# Patient Record
Sex: MALE
[De-identification: names, ages, dates, MRNs, and addresses within clinical notes are randomized per-mention and may not be internally consistent; named-entity substitution may affect disease eponyms.]

---

## 2021-10-13 PROBLEM — Z00.00 ENCOUNTER FOR PREVENTIVE HEALTH EXAMINATION: Status: ACTIVE | Noted: 2021-10-13

## 2021-10-19 ENCOUNTER — APPOINTMENT (OUTPATIENT)
Dept: SURGERY | Facility: CLINIC | Age: 66
End: 2021-10-19
Payer: COMMERCIAL

## 2021-10-19 VITALS
HEART RATE: 63 BPM | BODY MASS INDEX: 32.42 KG/M2 | TEMPERATURE: 97.2 F | SYSTOLIC BLOOD PRESSURE: 149 MMHG | WEIGHT: 242 LBS | DIASTOLIC BLOOD PRESSURE: 84 MMHG | OXYGEN SATURATION: 97 % | HEIGHT: 72.5 IN

## 2021-10-19 DIAGNOSIS — Z78.9 OTHER SPECIFIED HEALTH STATUS: ICD-10-CM

## 2021-10-19 DIAGNOSIS — Z87.891 PERSONAL HISTORY OF NICOTINE DEPENDENCE: ICD-10-CM

## 2021-10-19 DIAGNOSIS — Z82.49 FAMILY HISTORY OF ISCHEMIC HEART DISEASE AND OTHER DISEASES OF THE CIRCULATORY SYSTEM: ICD-10-CM

## 2021-10-19 DIAGNOSIS — K40.90 UNILATERAL INGUINAL HERNIA, W/OUT OBSTRUCTION OR GANGRENE, NOT SPECIFIED AS RECURRENT: ICD-10-CM

## 2021-10-19 DIAGNOSIS — K42.9 UMBILICAL HERNIA W/OUT OBSTRUCTION OR GANGRENE: ICD-10-CM

## 2021-10-19 PROCEDURE — 99204 OFFICE O/P NEW MOD 45 MIN: CPT

## 2021-10-19 RX ORDER — OLMESARTAN MEDOXOMIL 40 MG/1
TABLET, FILM COATED ORAL
Refills: 0 | Status: ACTIVE | COMMUNITY

## 2021-10-19 RX ORDER — ASPIRIN 81 MG/1
81 TABLET, CHEWABLE ORAL
Refills: 0 | Status: ACTIVE | COMMUNITY

## 2021-10-19 RX ORDER — FENOFIBRATE 145 MG/1
145 TABLET, COATED ORAL
Refills: 0 | Status: ACTIVE | COMMUNITY

## 2021-10-19 NOTE — ASSESSMENT
[FreeTextEntry1] : Mr. Griffiths is a 66-year-old man with a right inguinal hernia, an umbilical hernia, and a possible left inguinal hernia.  We will plan for a robotic-assisted right, possible bilateral, inguinal hernia repair with mesh and umbilical hernia repair with possible mesh on November 17, 2021.

## 2021-10-19 NOTE — CONSULT LETTER
[FreeTextEntry1] : 2021\par \par \par \par Buster Minor M.D.\par [address], [address]\[place] [address]\par Telephone #: [phone]\par \par \par Re: Tj Griffiths\par : 1955\par \par \par Dear Dr. Minor:\par \par I had the opportunity to see Mr. Griffiths today for evaluation and management of a right inguinal hernia.  He stated the hernia has been present for an unclear amount of time.  He first noticed the hernia as a bulge in the right groin.  He denied significant pain of the hernia.  He stated the hernia is enlarging.\par \par On physical examination, his height is 6 feet 0.5 inches, his weight is 242 pounds, and BMI is 32.37.  His temperature is 97.2 °F, blood pressure is 149/84, heart rate 63, and O2 saturation 97% on room air.  In general, he is a well-dressed, well-nourished man who appears his stated age and is in no acute distress.  He is calm, alert and oriented x 3.  HEENT exam demonstrates no scleral icterus and a normocephalic atraumatic appearance.  His abdomen has audible bowel sounds, is soft, non-tender, and non-distended.  There is no hepatosplenomegaly.  There is a reducible, non-tender umbilical hernia.  His extremities are warm and dry without evidence of clubbing, cyanosis, or edema.  Bilateral groin examination demonstrated a reducible right inguinal hernia and a possible left inguinal hernia.  \par \par In summary, Mr. Griffiths is a 66-year-old man with a right inguinal hernia, an umbilical hernia, and a possible left inguinal hernia.  We will plan for a robotic-assisted right, possible bilateral, inguinal hernia repair with mesh and umbilical hernia repair with possible mesh on 2021.\par \par Thank you for the opportunity to care for this patient. Please do not hesitate to contact me in the event that you have any questions or concerns about the care of this patient.\par \par Sincerely,\par \par \par \par Brandi Pool M.D.\par \par CC:\par Sheldon Kanchan, M.D.\par [address]\[place] [address]\par Telephone #: [phone]
Diagnosis/Treatment Options/Palliative Care Referral/Prognosis

## 2021-10-19 NOTE — PHYSICAL EXAM
[Calm] : calm [de-identified] : NAD, comfortable [de-identified] : NCAT, no scleral icterus [de-identified] : +BS soft NT ND.  No hepatosplenomegaly.  Small umbilical hernia, reducible and non-tender. [de-identified] : Reducible right inguinal hernia.  Possible left inguinal hernia. [de-identified] : No clubbing, cyanosis, or edema. [de-identified] : Warm, dry. [de-identified] : A&Ox3

## 2021-10-19 NOTE — HISTORY OF PRESENT ILLNESS
[de-identified] : Mr. Griffiths presented today for evaluation and management of a right inguinal hernia.  He stated the hernia has been present for an unclear amount of time.  He first noticed the hernia as a bulge in the right groin.  He denied significant pain of the hernia.  He stated the hernia is enlarging.

## 2024-01-12 ENCOUNTER — APPOINTMENT (OUTPATIENT)
Dept: ENDOCRINOLOGY | Facility: CLINIC | Age: 69
End: 2024-01-12
Payer: COMMERCIAL

## 2024-01-12 VITALS
SYSTOLIC BLOOD PRESSURE: 145 MMHG | WEIGHT: 240 LBS | DIASTOLIC BLOOD PRESSURE: 79 MMHG | HEART RATE: 67 BPM | BODY MASS INDEX: 32.51 KG/M2 | HEIGHT: 72 IN

## 2024-01-12 DIAGNOSIS — Z86.39 PERSONAL HISTORY OF OTHER ENDOCRINE, NUTRITIONAL AND METABOLIC DISEASE: ICD-10-CM

## 2024-01-12 DIAGNOSIS — Z83.3 FAMILY HISTORY OF DIABETES MELLITUS: ICD-10-CM

## 2024-01-12 DIAGNOSIS — Z86.79 PERSONAL HISTORY OF OTHER DISEASES OF THE CIRCULATORY SYSTEM: ICD-10-CM

## 2024-01-12 DIAGNOSIS — E78.5 HYPERLIPIDEMIA, UNSPECIFIED: ICD-10-CM

## 2024-01-12 LAB
GLUCOSE BLDC GLUCOMTR-MCNC: 224
HBA1C MFR BLD HPLC: 8.7

## 2024-01-12 PROCEDURE — 82962 GLUCOSE BLOOD TEST: CPT

## 2024-01-12 PROCEDURE — 83036 HEMOGLOBIN GLYCOSYLATED A1C: CPT | Mod: QW

## 2024-01-12 PROCEDURE — 99205 OFFICE O/P NEW HI 60 MIN: CPT | Mod: GC,25

## 2024-01-12 PROCEDURE — 36415 COLL VENOUS BLD VENIPUNCTURE: CPT

## 2024-01-12 RX ORDER — PIOGLITAZONE HYDROCHLORIDE 30 MG/1
30 TABLET ORAL
Refills: 0 | Status: DISCONTINUED | COMMUNITY
End: 2024-01-12

## 2024-01-12 RX ORDER — SEMAGLUTIDE 1.34 MG/ML
2 INJECTION, SOLUTION SUBCUTANEOUS
Refills: 0 | Status: DISCONTINUED | COMMUNITY
End: 2024-01-12

## 2024-01-12 NOTE — END OF VISIT
[] : Fellow [FreeTextEntry3] : Agree with Dr. Franco assessment and plan above. The patient has uncontrolled type 2 diabetes and requires improved diabetes management. He admits that he likes eating and declines nutrition visit today. On food recall, diet is high in starches/carbohydrates and portion size.  The patient is aware that diet requires improvement but admits changes are hard.  A1c goal for this patient is 7%.  Pioglitazone is weight unfavorable and we can discontinue that.  While ozempic is a good medication, he requiers better glycemic and weight control. Change to tirzepatide today. Patient will get back to us on Xigduo doses-if not on maximum dose we have room to increase there as well. Educated that he's already on 3 oral diabetes medicines, next step would be basal insulin and/or repaglinide with meals.  He was informed that insulin is weight unfavorable [Time Spent: ___ minutes] : I have spent [unfilled] minutes of time on the encounter. [>50% of the face to face encounter time was spent on counseling and/or coordination of care for ___] : Greater than 50% of the face to face encounter time was spent on counseling and/or coordination of care for [unfilled]

## 2024-01-12 NOTE — PHYSICAL EXAM
[Alert] : alert [Well Nourished] : well nourished [No Acute Distress] : no acute distress [Well Developed] : well developed [Normal Sclera/Conjunctiva] : normal sclera/conjunctiva [EOMI] : extra ocular movement intact [No Proptosis] : no proptosis [Normal Oropharynx] : the oropharynx was normal [Thyroid Not Enlarged] : the thyroid was not enlarged [No Thyroid Nodules] : no palpable thyroid nodules [No Respiratory Distress] : no respiratory distress [No Accessory Muscle Use] : no accessory muscle use [Clear to Auscultation] : lungs were clear to auscultation bilaterally [Normal S1, S2] : normal S1 and S2 [Normal Rate] : heart rate was normal [Regular Rhythm] : with a regular rhythm [No Edema] : no peripheral edema [Pedal Pulses Normal] : the pedal pulses are present [Normal Bowel Sounds] : normal bowel sounds [Not Tender] : non-tender [Not Distended] : not distended [Soft] : abdomen soft [Normal Anterior Cervical Nodes] : no anterior cervical lymphadenopathy [Normal Posterior Cervical Nodes] : no posterior cervical lymphadenopathy [No Stigmata of Cushings Syndrome] : no stigmata of Cushings Syndrome [Normal Gait] : normal gait [Normal Strength/Tone] : muscle strength and tone were normal [Normal Reflexes] : deep tendon reflexes were 2+ and symmetric [No Tremors] : no tremors [Normal Sensation on Monofilament Testing] : normal sensation on monofilament testing of lower extremities [Oriented x3] : oriented to person, place, and time [Acanthosis Nigricans] : no acanthosis nigricans [Foot Ulcers] : no foot ulcers [Right foot was examined, including] : right foot ~C was examined, including visual inspection with sensory and pulse exams [Left foot was examined, including] : left foot ~C was examined, including visual inspection with sensory and pulse exams [Diminished Throughout Both Feet] : normal tactile sensation with monofilament testing throughout both feet [#1 Diminished] : number 1 was normal [#2 Diminished] : number 2 was normal [#3 Diminished] : number 3 was normal [#4 Diminished] : number 4 was normal [#5 Diminished] : number 5 was normal [#6 Diminished] : number 6 was normal [#7 Diminished] : number 7 was normal [#8 Diminished] : number 8 was normal [#9 Diminished] : number 9 was normal [#10 Diminished] : number 10 was normal [de-identified] : No foot ulcers or lesions [de-identified] : Normal sensation on monofilament testing

## 2024-01-12 NOTE — ASSESSMENT
[Diabetes Foot Care] : diabetes foot care [Long Term Vascular Complications] : long term vascular complications of diabetes [Carbohydrate Consistent Diet] : carbohydrate consistent diet [Importance of Diet and Exercise] : importance of diet and exercise to improve glycemic control, achieve weight loss and improve cardiovascular health [Self Monitoring of Blood Glucose] : self monitoring of blood glucose [FreeTextEntry1] : # Type 2 Diabetes Mellitus # BMI 32.5 - current poc a1c 8.7 (it was endorsed there was some calibration issue), therefore will send in the formal a1c testing - pt currently taking Ozempic 0.5 weekly, pioglitazone 30 daily, Xigduo (Dapagliflozin and metformin) - the diet appears to consist of heavy amount of carbohydrates with fruits - CGM indicates higher readings >200 during lunch time - overall DM is not well controlled - counselled pt to work on reducing carbohydrate intake from big plate to 1/2 cup of rice.  And to observe which fruits that he is taking is spiking the glucose levels.  told him the berries have the lower glycemic index.   - counselled pt to increase moderate intensity exercise to at least 150mins/week - pt will call back the office to verify the dose of Xigduo - the dose will have to be maximized - due to resistance in weight loss despite being on Ozempic for one year, Pioglitazone may be contributing to the counter weight gain.  Since benefits do not appear to outweigh risk in this patient, advised pt to discontinue the drug - warned pt that he may notice worsening hyperglycemia upon discontinuing pioglitazone, and to call the patient if he's experiencing that.  The option may be to add Repaglinide.  or PT may ultimately need low dose basal insulin.  - to assist appetite suppression and weight management, will start Mounjaro 2.5 weekly and discontinue ozempic.  IF Mounjaro is not covered, will further increase the dose of Ozempic. - monofilament testing performed with good cutaneous sensation - will send a1c, bmp, lipid panel and urine microalbumin - referral for nutrition, ophthalmology and podiatry  # Hyperlipidemia - pt on fenofibrate, endorsed has never taken statin before - will check lipid panel - since pt is >40 years age + diabetes, will start moderate dose statin right now with Lipitor 10 daily -  LDL-C goal for this pt is <70 mg/dL - pt denies underlying liver disease - told pt that muscle cramps/myopathy can be a rare side effects  d/w Dr. Pinon

## 2024-01-12 NOTE — HISTORY OF PRESENT ILLNESS
[FreeTextEntry1] : 67 yo with Type 2 DM, HTN, HLD here for established care.  Pt referred to here by Dr. Minor.   Pt has seen prior endocrinologist before but frustrated.  currently taking ozempic 0.5 weekly, piaglitazone 30 daily, Xigduo (Dapaglifozin and metformin).  Currently managed by Dr. Minor Currently has leland 3 from Jan 6-12: >250 is 4%, 181-250 33%,  63% 6am  <150, 12pm 200-250, 6pm high 100s, 12am high 100s to mid 100s.   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    daughter is the recent graduate from med school

## 2024-01-17 DIAGNOSIS — E87.0 HYPEROSMOLALITY AND HYPERNATREMIA: ICD-10-CM

## 2024-01-17 LAB
ANION GAP SERPL CALC-SCNC: 14 MMOL/L
BUN SERPL-MCNC: 20 MG/DL
CALCIUM SERPL-MCNC: 12.1 MG/DL
CHLORIDE SERPL-SCNC: 109 MMOL/L
CHOLEST SERPL-MCNC: 198 MG/DL
CO2 SERPL-SCNC: 24 MMOL/L
CREAT SERPL-MCNC: 1.23 MG/DL
EGFR: 65 ML/MIN/1.73M2
ESTIMATED AVERAGE GLUCOSE: 166 MG/DL
GLUCOSE SERPL-MCNC: 155 MG/DL
HBA1C MFR BLD HPLC: 7.4 %
HDLC SERPL-MCNC: 55 MG/DL
LDLC SERPL CALC-MCNC: 117 MG/DL
NONHDLC SERPL-MCNC: 143 MG/DL
POTASSIUM SERPL-SCNC: 4.8 MMOL/L
SODIUM SERPL-SCNC: 147 MMOL/L
TRIGL SERPL-MCNC: 151 MG/DL

## 2024-01-17 RX ORDER — TIRZEPATIDE 2.5 MG/.5ML
2.5 INJECTION, SOLUTION SUBCUTANEOUS
Qty: 4 | Refills: 2 | Status: DISCONTINUED | COMMUNITY
Start: 2024-01-12 | End: 2024-01-17

## 2024-01-23 ENCOUNTER — APPOINTMENT (OUTPATIENT)
Dept: INTERNAL MEDICINE | Facility: CLINIC | Age: 69
End: 2024-01-23

## 2024-01-24 ENCOUNTER — APPOINTMENT (OUTPATIENT)
Dept: OPHTHALMOLOGY | Facility: CLINIC | Age: 69
End: 2024-01-24
Payer: COMMERCIAL

## 2024-01-24 ENCOUNTER — NON-APPOINTMENT (OUTPATIENT)
Age: 69
End: 2024-01-24

## 2024-01-24 PROCEDURE — 92134 CPTRZ OPH DX IMG PST SGM RTA: CPT

## 2024-01-24 PROCEDURE — 92004 COMPRE OPH EXAM NEW PT 1/>: CPT

## 2024-02-14 ENCOUNTER — NON-APPOINTMENT (OUTPATIENT)
Age: 69
End: 2024-02-14

## 2024-02-20 ENCOUNTER — APPOINTMENT (OUTPATIENT)
Dept: INTERNAL MEDICINE | Facility: CLINIC | Age: 69
End: 2024-02-20

## 2024-04-12 ENCOUNTER — APPOINTMENT (OUTPATIENT)
Dept: ENDOCRINOLOGY | Facility: CLINIC | Age: 69
End: 2024-04-12
Payer: COMMERCIAL

## 2024-04-12 VITALS
WEIGHT: 253 LBS | SYSTOLIC BLOOD PRESSURE: 130 MMHG | BODY MASS INDEX: 34.31 KG/M2 | HEART RATE: 70 BPM | DIASTOLIC BLOOD PRESSURE: 72 MMHG

## 2024-04-12 DIAGNOSIS — E11.9 TYPE 2 DIABETES MELLITUS W/OUT COMPLICATIONS: ICD-10-CM

## 2024-04-12 DIAGNOSIS — E83.52 HYPERCALCEMIA: ICD-10-CM

## 2024-04-12 LAB — GLUCOSE BLDC GLUCOMTR-MCNC: 223

## 2024-04-12 PROCEDURE — 99214 OFFICE O/P EST MOD 30 MIN: CPT | Mod: GC

## 2024-04-12 PROCEDURE — 36415 COLL VENOUS BLD VENIPUNCTURE: CPT

## 2024-04-12 RX ORDER — DAPAGLIFLOZIN AND METFORMIN HYDROCHLORIDE 2.5; 1 MG/1; MG/1
TABLET, FILM COATED, EXTENDED RELEASE ORAL
Refills: 0 | Status: DISCONTINUED | COMMUNITY
End: 2024-04-12

## 2024-04-12 RX ORDER — FENOFIBRATE 145 MG/1
145 TABLET, COATED ORAL
Refills: 0 | Status: ACTIVE | COMMUNITY

## 2024-04-12 RX ORDER — DAPAGLIFLOZIN AND METFORMIN HYDROCHLORIDE 2.5; 1 MG/1; MG/1
TABLET, FILM COATED, EXTENDED RELEASE ORAL
Refills: 0 | Status: ACTIVE | COMMUNITY

## 2024-04-12 RX ORDER — ATORVASTATIN CALCIUM 20 MG/1
20 TABLET, FILM COATED ORAL DAILY
Qty: 30 | Refills: 2 | Status: ACTIVE | COMMUNITY
Start: 2024-01-12 | End: 1900-01-01

## 2024-04-12 RX ORDER — PIOGLITAZONE HYDROCHLORIDE 30 MG/1
30 TABLET ORAL
Refills: 0 | Status: ACTIVE | COMMUNITY

## 2024-04-12 NOTE — HISTORY OF PRESENT ILLNESS
[FreeTextEntry1] : 67 yo with Type 2 DM, HTN, HLD here for f/u visit  4/12/24 - f/u visit 30 days - 12 am 166, 3am 152, 6am 136, 9am 167, 12pm 170, 3pm 170, 6pm 148, 9pm 148, 12am 143 75% in range , 25% 181-250 - 253 lbs currently morning: pizza bread toasted + cream chesse + glass of milk lunch: tomato + beans + soup + large amount of carbohydrate dinner: light dinner , HDL 55, , total cholesterol 198 mounjaro was apparently refused by pharmacy pt does not wish to see the nutritionist  01/12/24 - HPI Pt referred to here by Dr. Minor.   Pt has seen prior endocrinologist before but frustrated.  currently taking ozempic 0.5 weekly, piaglitazone 30 daily, Xigduo (Dapaglifozin and metformin).  Currently managed by Dr. Minor Currently has leland 3 from Jan 6-12: >250 is 4%, 181-250 33%,  63% 6am  <150, 12pm 200-250, 6pm high 100s, 12am high 100s to mid 100s.   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   daughter is the recent graduate from med school

## 2024-04-12 NOTE — ASSESSMENT
[FreeTextEntry1] : # Type 2 Diabetes Mellitus # BMI 32.5 - a1c 8.0 - still uncontrolled - pt does not wish to see nutritionist, he states he will continue to eat what he has been eating for his whole life since he only has some time left - will increase ozempic to 2.0 weekly, hopeful that this will decrease his appetite - c/w pioglitazone and Xigduo (pt needs to bring the true dosing of xigduo) - will increase statin to 20 daily - pt needs to be up to date with ophthalmology and podiatry  # Hyperlipidemia - increased lipitor 20 daily  # elevated sodium - likely a lab error - repeat cmp today  # elevated calcium - repeat cmp today - will check pth and vitamin d  d/w Dr. Prajapati

## 2024-04-12 NOTE — END OF VISIT
[] : Fellow [FreeTextEntry3] : 69yoM h/o T2DM, class I obesity  #T2DM: On Xigduo (Metformin/dapaglifozin), pioglitazone 30mg, Ozempic 1mg. Has high carb diet. Appetite not controlled. Uses Dipika CGM.  - A1c 8% today. Increase Ozempic to 2mg.   #HLD: on atorvastatin 10mg, fenofibrate 145. LDL >117 -increase atorvastatin to 20mg  #HyperCa on last BMP: recheck Ca and check PTH, vit D.

## 2024-04-12 NOTE — PHYSICAL EXAM
[Alert] : alert [Well Nourished] : well nourished [No Acute Distress] : no acute distress [Well Developed] : well developed [Normal Sclera/Conjunctiva] : normal sclera/conjunctiva [EOMI] : extra ocular movement intact [No Proptosis] : no proptosis [Normal Oropharynx] : the oropharynx was normal [Thyroid Not Enlarged] : the thyroid was not enlarged [No Thyroid Nodules] : no palpable thyroid nodules [No Respiratory Distress] : no respiratory distress [No Accessory Muscle Use] : no accessory muscle use [Clear to Auscultation] : lungs were clear to auscultation bilaterally [Normal S1, S2] : normal S1 and S2 [Normal Rate] : heart rate was normal [Regular Rhythm] : with a regular rhythm [No Edema] : no peripheral edema [Pedal Pulses Normal] : the pedal pulses are present [Normal Bowel Sounds] : normal bowel sounds [Not Tender] : non-tender [Not Distended] : not distended [Soft] : abdomen soft [Normal Anterior Cervical Nodes] : no anterior cervical lymphadenopathy [No Stigmata of Cushings Syndrome] : no stigmata of Cushings Syndrome [Normal Gait] : normal gait [Normal Strength/Tone] : muscle strength and tone were normal [Oriented x3] : oriented to person, place, and time [Acanthosis Nigricans] : no acanthosis nigricans [Foot Ulcers] : no foot ulcers [de-identified] : No foot ulcers or lesions

## 2024-04-15 RX ORDER — SEMAGLUTIDE 2.68 MG/ML
8 INJECTION, SOLUTION SUBCUTANEOUS
Qty: 12 | Refills: 3 | Status: ACTIVE | COMMUNITY
Start: 2024-01-17 | End: 1900-01-01

## 2024-04-17 LAB
25(OH)D3 SERPL-MCNC: 31.6 NG/ML
ALBUMIN SERPL ELPH-MCNC: 4.6 G/DL
ALP BLD-CCNC: 72 U/L
ALT SERPL-CCNC: 23 U/L
ANION GAP SERPL CALC-SCNC: 17 MMOL/L
AST SERPL-CCNC: 16 U/L
BILIRUB SERPL-MCNC: 0.2 MG/DL
BUN SERPL-MCNC: 26 MG/DL
CALCIUM SERPL-MCNC: 11.7 MG/DL
CALCIUM SERPL-MCNC: 11.7 MG/DL
CHLORIDE SERPL-SCNC: 109 MMOL/L
CO2 SERPL-SCNC: 23 MMOL/L
CREAT SERPL-MCNC: 1.35 MG/DL
EGFR: 58 ML/MIN/1.73M2
GLUCOSE SERPL-MCNC: 199 MG/DL
PARATHYROID HORMONE INTACT: 56 PG/ML
POTASSIUM SERPL-SCNC: 4.9 MMOL/L
PROT SERPL-MCNC: 7.2 G/DL
SODIUM SERPL-SCNC: 148 MMOL/L

## 2024-04-26 ENCOUNTER — NON-APPOINTMENT (OUTPATIENT)
Age: 69
End: 2024-04-26

## 2024-05-24 ENCOUNTER — NON-APPOINTMENT (OUTPATIENT)
Age: 69
End: 2024-05-24

## 2024-07-02 ENCOUNTER — NON-APPOINTMENT (OUTPATIENT)
Age: 69
End: 2024-07-02

## 2024-07-05 ENCOUNTER — APPOINTMENT (OUTPATIENT)
Dept: ENDOCRINOLOGY | Facility: CLINIC | Age: 69
End: 2024-07-05